# Patient Record
Sex: MALE | Race: WHITE | Employment: OTHER | URBAN - METROPOLITAN AREA
[De-identification: names, ages, dates, MRNs, and addresses within clinical notes are randomized per-mention and may not be internally consistent; named-entity substitution may affect disease eponyms.]

---

## 2018-07-11 ENCOUNTER — HOSPITAL ENCOUNTER (OUTPATIENT)
Age: 68
Setting detail: OUTPATIENT SURGERY
Discharge: HOME | End: 2018-07-11
Attending: INTERNAL MEDICINE | Admitting: INTERNAL MEDICINE
Payer: MEDICARE

## 2018-07-11 VITALS
RESPIRATION RATE: 16 BRPM | DIASTOLIC BLOOD PRESSURE: 72 MMHG | HEART RATE: 60 BPM | SYSTOLIC BLOOD PRESSURE: 120 MMHG | HEIGHT: 75 IN | BODY MASS INDEX: 29.03 KG/M2 | WEIGHT: 233.5 LBS | TEMPERATURE: 96.3 F | OXYGEN SATURATION: 94 %

## 2018-07-11 PROCEDURE — G0500 MOD SEDAT ENDO SERVICE >5YRS: HCPCS | Performed by: INTERNAL MEDICINE

## 2018-07-11 PROCEDURE — 77030038020 HC MANFLD NEPTUNE STRY -B: Performed by: INTERNAL MEDICINE

## 2018-07-11 PROCEDURE — 77030020256 HC SOL INJ NACL 0.9%  500ML: Performed by: INTERNAL MEDICINE

## 2018-07-11 PROCEDURE — 76040000007: Performed by: INTERNAL MEDICINE

## 2018-07-11 PROCEDURE — 88305 TISSUE EXAM BY PATHOLOGIST: CPT | Performed by: INTERNAL MEDICINE

## 2018-07-11 PROCEDURE — 74011250636 HC RX REV CODE- 250/636

## 2018-07-11 RX ORDER — FENTANYL CITRATE 50 UG/ML
100 INJECTION, SOLUTION INTRAMUSCULAR; INTRAVENOUS
Status: DISCONTINUED | OUTPATIENT
Start: 2018-07-11 | End: 2018-07-11 | Stop reason: HOSPADM

## 2018-07-11 RX ORDER — SODIUM CHLORIDE 9 MG/ML
100 INJECTION, SOLUTION INTRAVENOUS CONTINUOUS
Status: CANCELLED | OUTPATIENT
Start: 2018-07-11 | End: 2018-07-11

## 2018-07-11 RX ORDER — DEXTROMETHORPHAN/PSEUDOEPHED 2.5-7.5/.8
1.2 DROPS ORAL
Status: CANCELLED | OUTPATIENT
Start: 2018-07-11

## 2018-07-11 RX ORDER — MIDAZOLAM HYDROCHLORIDE 1 MG/ML
.5-5 INJECTION, SOLUTION INTRAMUSCULAR; INTRAVENOUS
Status: DISCONTINUED | OUTPATIENT
Start: 2018-07-11 | End: 2018-07-11 | Stop reason: HOSPADM

## 2018-07-11 RX ORDER — EPINEPHRINE 0.1 MG/ML
1 INJECTION INTRACARDIAC; INTRAVENOUS
Status: CANCELLED | OUTPATIENT
Start: 2018-07-11 | End: 2018-07-12

## 2018-07-11 RX ORDER — ATROPINE SULFATE 0.1 MG/ML
0.5 INJECTION INTRAVENOUS
Status: CANCELLED | OUTPATIENT
Start: 2018-07-11 | End: 2018-07-12

## 2018-07-11 RX ORDER — OMEPRAZOLE 20 MG/1
20 CAPSULE, DELAYED RELEASE ORAL DAILY
COMMUNITY

## 2018-07-11 RX ORDER — NALOXONE HYDROCHLORIDE 0.4 MG/ML
0.4 INJECTION, SOLUTION INTRAMUSCULAR; INTRAVENOUS; SUBCUTANEOUS
Status: DISCONTINUED | OUTPATIENT
Start: 2018-07-11 | End: 2018-07-11 | Stop reason: HOSPADM

## 2018-07-11 RX ORDER — FLUMAZENIL 0.1 MG/ML
0.2 INJECTION INTRAVENOUS
Status: DISCONTINUED | OUTPATIENT
Start: 2018-07-11 | End: 2018-07-11 | Stop reason: HOSPADM

## 2018-07-11 RX ORDER — AMOXICILLIN 500 MG/1
500 CAPSULE ORAL
COMMUNITY

## 2018-07-11 RX ORDER — METOPROLOL TARTRATE 25 MG/1
25 TABLET, FILM COATED ORAL 2 TIMES DAILY
COMMUNITY

## 2018-07-11 NOTE — IP AVS SNAPSHOT
[address] [address] 
[phone] Patient: Sam Pittman MRN: [id number] About your hospitalization You were admitted on:  July 11, 2018 You last received care in the:  [facility] ENDOSCOPY You were discharged on:  July 11, 2018 Why you were hospitalized Your primary diagnosis was:  Not on File Follow-up Information None Discharge Orders None A check alicia indicates which time of day the medication should be taken. My Medications ASK your doctor about these medications Instructions Each Dose to Equal  
 Morning Noon Evening Bedtime  
 amoxicillin 500 mg capsule Commonly known as:  AMOXIL Your last dose was: Your next dose is: Take 500 mg by mouth. 500 mg  
    
   
   
   
  
 aspirin delayed-release 81 mg tablet Your last dose was: Your next dose is: Take 81 mg by mouth daily. 81 mg  
    
   
   
   
  
 metoprolol tartrate 25 mg tablet Commonly known as:  LOPRESSOR Your last dose was: Your next dose is: Take 25 mg by mouth two (2) times a day. 25 mg  
    
   
   
   
  
 multivitamin tablet Commonly known as:  ONE A DAY Your last dose was: Your next dose is: Take 1 Tab by mouth daily. 1 Tab  
    
   
   
   
  
 omeprazole 20 mg capsule Commonly known as:  PRILOSEC Your last dose was: Your next dose is: Take 20 mg by mouth daily. 20 mg  
    
   
   
   
  
 rosuvastatin 40 mg tablet Commonly known as:  CRESTOR Your last dose was: Your next dose is: Take 40 mg by mouth daily. Indications: HYPERCHOLESTEROLEMIA 40 mg Discharge Instructions Sam Pittman [id number] 
1950 COLON DISCHARGE INSTRUCTIONS Discomfort: Redness at IV site- apply warm compress to area; if redness or soreness persist- contact your physician There may be a slight amount of blood passed from the rectum Gaseous discomfort- walking, belching will help relieve any discomfort You may not operate a vehicle til the next day. You may not engage in an occupation involving machinery or appliances til the next day. You may not drink alcoholic beverages til the next day. DIET: 
 High fiber diet. ACTIVITY: 
You may not  resume your normal daily activities til the next day. it is recommended that you spend the remainder of the day resting -  avoid any strenuous activity. CALL RAMOS Arnold ANY SIGN OF: Increasing pain, nausea, vomiting Abdominal distension (swelling) New increased bleeding (oral or rectal) Fever (chills) Pain in chest area Bloody discharge from nose or mouth Shortness of breath You may not  take any Advil, Aspirin, Ibuprofen, Motrin, Aleve, or Goodys for 3 days, ONLY  Tylenol as needed for pain. Post procedure diagnosis:  HEMORRHOIDS, POLYPS, Follow-up Instructions: Your follow up colonoscopy will be in 5 years. We will notify you the results of your biopsy by letter within 2 weeks. Mahendra Cuadra MD 
July 11, 2018 Patient armband removed and shredded DISCHARGE SUMMARY from Nurse PATIENT INSTRUCTIONS: 
 
 
F-face looks uneven A-arms unable to move or move unevenly S-speech slurred or non-existent T-time-call 911 as soon as signs and symptoms begin-DO NOT go Back to bed or wait to see if you get better-TIME IS BRAIN. Warning Signs of HEART ATTACK Call 911 if you have these symptoms: 
? Chest discomfort.  Most heart attacks involve discomfort in the center of the chest that lasts more than a few minutes, or that goes away and comes back. It can feel like uncomfortable pressure, squeezing, fullness, or pain. ? Discomfort in other areas of the upper body. Symptoms can include pain or discomfort in one or both arms, the back, neck, jaw, or stomach. ? Shortness of breath with or without chest discomfort. ? Other signs may include breaking out in a cold sweat, nausea, or lightheadedness. Don't wait more than five minutes to call [address]! Fast action can save your life. Calling 911 is almost always the fastest way to get lifesaving treatment. Emergency Medical Services staff can begin treatment when they arrive  up to an hour sooner than if someone gets to the hospital by car. The discharge information has been reviewed with the patient and spouse. The patient and spouse verbalized understanding. Discharge medications reviewed with the patient and spouse and appropriate educational materials and side effects teaching were provided. ___________________________________________________________________________________________________________________________________ Introducing [facility]! [employer] introduces [employer] patient portal. Now you can access parts of your medical record, email your doctor's office, and request medication refills online. 1. In your internet browser, go to [email or web address] 2. Click on the First Time User? Click Here link in the Sign In box. You will see the New Member Sign Up page. 3. Enter your [employer] Access Code exactly as it appears below. You will not need to use this code after youve completed the sign-up process. If you do not sign up before the expiration date, you must request a new code. · [employer] Access Code: [email or web address] Expires: 10/9/2018 10:00 AM 
 
4.  Enter the last four digits of your Social Security Number (xxxx) and Date of Birth (mm/dd/yyyy) as indicated and click Submit. You will be taken to the next sign-up page. 5. Create a [employer] ID. This will be your [employer] login ID and cannot be changed, so think of one that is secure and easy to remember. 6. Create a [employer] password. You can change your password at any time. 7. Enter your Password Reset Question and Answer. This can be used at a later time if you forget your password. 8. Enter your e-mail address. You will receive e-mail notification when new information is available in [address] 9. Click Sign Up. You can now view and download portions of your medical record. 10. Click the Download Summary menu link to download a portable copy of your medical information. If you have questions, please visit the Frequently Asked Questions section of the [employer] website. Remember, [employer] is NOT to be used for urgent needs. For medical emergencies, dial 911. Now available from your iPhone and Android! Introducing [employer] [facility] As a [employer] patient, I wanted to make you aware of our electronic visit tool called Gabriel Shaggyanuraggavin. [employer] 24/7 allows you to connect within minutes with a medical provider 24 hours a day, seven days a week via a mobile device or tablet or logging into a secure website from your computer. You can access [facility] Leiva from anywhere in the [place]. A virtual visit might be right for you when you have a simple condition and feel like you just dont want to get out of bed, or cant get away from work for an appointment, when your regular [facility] provider is not available (evenings, weekends or holidays), or when youre out of town and need minor care. Electronic visits cost only $49 and if the [employer] 24/7 provider determines a prescription is needed to treat your condition, one can be electronically transmitted to a nearby pharmacy*. Please take a moment to enroll today if you have not already done so. The enrollment process is free and takes just a few minutes. To enroll, please download the [employer] 24/7 monster to your tablet or phone, or visit [email or web address] to enroll on your computer. And, as an [address] [place] [address] patient with a [employer] account, the results of your visits will be scanned into your electronic medical record and your primary care provider will be able to view the scanned results. We urge you to continue to see your regular [employer] provider for your ongoing medical care. And while your primary care provider may not be the one available when you seek a [employer] virtual visit, the peace of mind you get from getting a real diagnosis real time can be priceless. For more information on [employer], view our Frequently Asked Questions (FAQs) at [email or web address]. Sincerely, 
 
Gil Garcia MD 
Chief Medical Officer [employer] *:  certain medications cannot be prescribed via [employer] Providers Seen During Your Hospitalization Provider Specialty Primary office phone Kristie Carcamo MD Gastroenterology [phone] Your Primary Care Physician (PCP) Primary Care Physician Office Phone Office Fax Javier Edwards [phone] You are allergic to the following Allergen Reactions Carrot Hives Swelling Raw carrots reported by patient Peanut Hives Itching Swelling Recent Documentation Height Weight BMI Smoking Status 1.905 m 105.9 kg 29.19 kg/m2 Former Smoker Emergency Contacts Name Discharge Info Relation Home Work Mobile [address] CAREGIVER [3] Spouse [3] [phone] Patient Belongings The following personal items are in your possession at time of discharge: Please provide this summary of care documentation to your next provider. Signatures-by signing, you are acknowledging that this After Visit Summary has been reviewed with you and you have received a copy. Patient Signature:  ____________________________________________________________ Date:  ____________________________________________________________  
  
Suzon Mems Provider Signature:  ____________________________________________________________ Date:  ____________________________________________________________

## 2018-07-11 NOTE — H&P
This 78year old male presents for Colon Cancer Screening. History of Present Illness: 1. Colon Cancer Screening Prior screening:  colonoscopy, 08 by Dr. Timothy Avilez and normal.  Denies risk factors. Pertinent negatives include abdominal pain, change in bowel habits, constipation, decreased appetite, diarrhea, melena, nausea, vomiting and weight loss. Additional information: No family history of colon cancer, No family history of Crohn's/colitis and NSAID/ASA use. PROBLEM LIST: 
Problem Description Onset Date Mixed hyperlipidemia 2011 Gouty arthropathy 2011 Prostate finding 2011 Idiopathic peripheral neuropathy 2011 Vitamin D deficiency 2011 Dyson's esophagus with esophagitis 2016 PAST MEDICAL/SURGICAL HISTORY   (Detailed) Disease/disorder Onset Date Management Date Comments  
   both knees partial replacement 2016 Arthroscopy knee DIAGNOSTICS HISTORY: 
Test Ordered Interpretation Result completed * CT CHEST W/DYE 05/10/2013   05/10/2013 Heart Catheterization Right And Left  see detail See scanned report. Successful stenting of right PDA, with placement of overlapping drug eluting stents 2016 Test Ordered Ordering Comments Modifier * CT CHEST W/DYE 05/10/2013 Heart Catheterization Right And Left Family History:  (Detailed) Relationship Family Member Name  Age at Death Condition Onset Age Cause of Death Father  Debbie Garcia Father  Y 80 heart failure  Y Mother  Y 80   N Mother  Y  old age  [place] Social History:  (Detailed) Tobacco use reviewed. The patient is right-handed. Preferred language is Intermedia. MARITAL STATUS/FAMILY/SOCIAL SUPPORT Currently . Tobacco use status: Ex-cigarette smoker. Smoking status: Former smoker. SMOKING STATUS Use Status Type Smoking Status Usage Per Day Years Used Total Pack Years  
yes Cigarette Former smoker 2 Packs 20.00 40.00 There is passive smoke exposure. ALCOHOL There is a history of alcohol use. Type: Beer and wine. 1 drink CAFFEINE The patient uses caffeine: coffee - 2 cups a day. Patient Status Completed with information received for patient transitioning into care. Medication Reconciliation Medications reconciled today. Medication Reviewed Adherence Medication Name Sig Desc Elsewhere Status  
taking as directed Toprol XL 25 mg tablet,extended release take 1 tablet (25MG)  by oral route  every day N Verified  
taking as directed Crestor 40 mg tablet take 1 tablet by oral route  every day Y Verified  
taking as directed fenofibrate nanocrystallized 145 mg tablet take 1 tablet by oral route  every day Y Verified  
taking as directed EpiPen 0.3 mg/0.3 mL injection, auto-injector inject (0.3MG)  by intramuscular route once as needed for anaphylaxis N Verified  
taking as directed Effient 10 mg tablet take 1 tablet by oral route  every day Y Verified  
taking as directed Aspirin Low Dose 81 mg tablet,delayed release take 2 tablet by oral route  every day Y Verified  
taking as directed OMEPRAZOLE 20MG CAP TAKE ONE CAPSULE BY MOUTH ONCE DAILY 30 TO 60  MINUTES BEFORE A MEAL. N Verified  
taking as directed GaviLyte-N 420 gram oral solution take as directed by physician N Verified Medications (added, continued or stopped this visit): 
Started Medication Directions PRN Status PRN Reason Instruction Stopped Aspirin Low Dose 81 mg tablet,delayed release take 2 tablet by oral route  every day N     
 Crestor 40 mg tablet take 1 tablet by oral route  every day N  Effient 10 mg tablet take 1 tablet by oral route  every day N     
05/18/2016 EpiPen 0.3 mg/0.3 mL injection, auto-injector inject (0.3MG)  by intramuscular route once as needed for anaphylaxis N     
 fenofibrate nanocrystallized 145 mg tablet take 1 tablet by oral route  every day N     
06/12/2018 GaviLyte-N 420 gram oral solution take as directed by physician N     
07/26/2017 OMEPRAZOLE 20MG CAP TAKE ONE CAPSULE BY MOUTH ONCE DAILY 30 TO 60  MINUTES BEFORE A MEAL. N     
12/14/2012 Toprol XL 25 mg tablet,extended release take 1 tablet (25MG)  by oral route  every day N Allergies: 
Ingredient Reaction Medication Name Comment NO KNOWN ALLERGIES Review of Systems System Neg/Pos Details Constitutional Negative Chills, fever, malaise and weight loss. ENMT Negative Nasal congestion and sore throat. Eyes Negative Double vision. Respiratory Negative Asthma, chronic cough and wheezing. Cardio Negative Chest pain, edema and irregular heartbeat/palpitations. GI Negative Abdominal pain, change in bowel habits, constipation, decreased appetite, diarrhea, dysphagia, heartburn, hematemesis, hematochezia, melena, nausea, reflux and vomiting.  Negative Dysuria and hematuria. Endocrine Negative Cold intolerance, gynecomastia, heat intolerance and increased thirst.  
Neuro Negative Dizziness, headache, numbness, tremors and vertigo. Psych Negative Anxiety, depression and increased stress. Integumentary Negative Hives and rash. MS Negative Back pain, joint pain and myalgia. Hema/Lymph Positive Easy bleeding, Easy bruising. Allergic/Immuno Negative Contact allergy, food allergies and seasonal allergies. Vital Signs Height Time ft in cm Last Measured Height Position 9:45 AM 6.0 3.00 190.50 06/12/2018 0 Weight/BSA/BMI Time lb oz kg Context BMI kg/m2 BSA m2  
9:45 .00  108.862 dressed with shoes 30.00  Date/Time Temp Pulse BP MAP (Calculated) Arterial Line 1 BP (mmHg) BP Patient Position Resp SpO2 O2 Device O2 Flow Rate (L/min) Pre/Post Ductal Weight  
  07/11/18 0857 --  54 118/74 89 -- -- 12 97 % Nasal cannula 2 l/min -- --  
  07/11/18 0748 96 °F (35.6 °C) 61 115/75 88 -- -- 14 95 % Room air -- -- 105.9 kg (233 lb 8 oz)  
   
 
 
PHYSICAL EXAM: 
Exam Findings Details Constitutional Normal Well developed. Eyes Normal Conjunctiva - Right: Normal, Left: Normal. Sclera - Right: Normal, Left: Normal.  
Nasopharynx * Lips/teeth/gums - missing teeth. Nasopharynx Normal Buccal mucosa - Normal.  
Neck Exam Normal Inspection - Normal. Palpation - Normal. Thyroid gland - Normal.  
Respiratory Normal Inspection - Normal. Auscultation - Normal.  
Cardiovascular Normal Regular rate and rhythm. No murmurs, gallops, or rubs. Vascular Normal Pulses - Radial: Normal, Brachial: Normal, Dorsalis pedis: Normal, Posterior tibial: Normal.  
Abdomen Normal Inspection - Normal. Appliance(s) - None. Abdominal muscles - Normal. Auscultation - Normal. Percussion - Normal. Anterior palpation - Normal, No guarding. Umbilicus - Normal. No abdominal tenderness. No hepatic enlargement. No spleen enlargement. No hernia. No ascites. Torres's sign - Negative. No hepatic tenderness. No hepatic bruit. Skin Normal Inspection - Normal.  
Musculoskeletal Normal Hands/Wrist - Right: Normal, Left: Normal.  
Extremity Normal No edema. Neurological Normal Fine motor skills - Normal.  
Psychiatric * Oriented to time, place, person and situation. Psychiatric Normal Appropriate mood and affect. Assessment/Plan # Detail Type Description 1. Assessment Encounter for screening colonoscopy (Z12.11). Patient Plan 78year year old male  patient of Dr. Yane Joy for colonoscopy screening. Last colonoscopy completed by Dr. Cj Riggs was 05/07/2008. Impression revealed entire normal colon. Patient has allergies to nuts and corn with a urticaria and throat edema. Denies herbal consumption. Medical hx includes HTN, hyperlipidemia well controlled with medications. MI X2 in 2011 & 2013 with stent placement. No significant, pulmonary, GI, , musculoskeletal, or endocrine issues. Surgical hx unremarkable. No family history of colorectal CA. Denies tobacco use and reports 1-2 glasses of wine per night with dinner.  No significant weight gains or losses in the last 3-6 months. No heat or cold intolerances. Patient states  no N/V/D, fever, chills, sick contacts, SOB, abdominal or chest pain, changes in bowel pattern or constipation issues. Last BM once daily. Normal color, soft, formed in consistency. No evidence of blood or mucus. No dysphagia, apetite is good which consists of 3 meals per day. I explained the procedure of colonoscopy, where there is a chance of taking a biopsy, injecting, dilating or removing polyps. I explained also the risk and benefits involved which include but not limited to reaction to medication/ sedation, bleeding, perforation, where there may be a need for surgery. There is also a chance of missing a lesion or a polyp if the bowel prep is inadequate or the colon is unusually tortuous and difficult. I answered his questions. He was agreeable to proceed with the test. I gave him the [facility] to clean the bowels. He may have to take extra laxatives the days before to assure that the bowel prep is as perfect as possible.

## 2018-07-11 NOTE — PROCEDURES
[facility]  Colonoscopy Procedure Report  _______________________________________________________  Patient: Yin Rey                                         Attending Physician: Feliberto Barr MD    Patient ID: [id number]                                      Referring Physician: Sommer Graham MD    Exam Date: July 11, 2018 _______________________________________________________      Introduction: A  78 y.o. male patient, presents for outpatient Colonoscopy    Indications: Screen colon cancer, average risk and asymptomatic. He had a negative colonoscopy in 2008 by Dr Nilda Staton. Consent: The benefits, risks, and alternatives to the procedure were discussed and informed consent was obtained from the patient. Preparation: EKG, pulse, pulse oximetry and blood pressure were monitored throughout the procedure. ASA Classification: Class 2 - . The heart is an S1-S2 and regular heart rate and rhythm. Lungs are clear to auscultation and percussion. Abdomen is soft, nondistended, and nontender. Mental Status: awake, alert, and oriented to person, place, and time    Medications:  · Fentanyl 100 mcg IV before procedure. · Versed 4 mg IV throughout the procedure. Rectal Exam: One External hemorrhoids otherwise Normal Rectal Exam. No Blood. Prostate not enlarged. Pathology Specimens: One specimen removed. Procedure: The colonoscope was passed with ease through the anus under direct visualization and advanced to the cecum and 15 cm inside the terminal ileum. The scope was withdrawn and the mucosa was carefully examined. The quality of the preparation was excellent. The views were excellent. The patient's toleration of the procedure was excellent. Retroflexion was preformed in the ascending colon and hepatic flexure. The exam was done twice to the cecum. Total time is 14 minutes and withdrawal time is 10 minutes. Findings:    Rectum:   Small internal hemorrhoids.   Sigmoid: normal   Descending Colon:   Normal  Transverse Colon:   Normal  Ascending Colon:   Normal  Cecum:   2 mm sessile adenoma polyp in the cecum removed with cold forceps. Terminal Ileum:   Normal    Unplanned Events: There were no unplanned events. Estimated Blood Loss: None  Impressions:  External and internal hemorrhoids. 2 mm sessile adenoma polyp in the cecum removed with cold forceps. Normal Mucosa. No diverticula or other polyps found. Complications: None; patient tolerated the procedure well. Recommendations:  · Discharge home when standard parameters are met. · Resume a high fiber diet. · Colonoscopy recommendation in 5 years.     Procedure Codes:    · Kam Lyon [[id number]]    Endoscope Information:  Model Number(s)    [id number]       Assistant: None      Signed By: Richard Ahn MD Date: July 11, 2018

## 2018-07-11 NOTE — DISCHARGE INSTRUCTIONS
Kathy Collazo  [id number]  1950    COLON DISCHARGE INSTRUCTIONS    Discomfort:  Redness at IV site- apply warm compress to area; if redness or soreness persist- contact your physician  There may be a slight amount of blood passed from the rectum  Gaseous discomfort- walking, belching will help relieve any discomfort  You may not operate a vehicle til the next day. You may not engage in an occupation involving machinery or appliances til the next day. You may not drink alcoholic beverages til the next day. DIET:   High fiber diet. ACTIVITY:  You may not  resume your normal daily activities til the next day. it is recommended that you spend the remainder of the day resting -  avoid any strenuous activity. CALL M.D.  IF ANY SIGN OF:   Increasing pain, nausea, vomiting  Abdominal distension (swelling)  New increased bleeding (oral or rectal)  Fever (chills)  Pain in chest area  Bloody discharge from nose or mouth  Shortness of breath    You may not  take any Advil, Aspirin, Ibuprofen, Motrin, Aleve, or Goodys for 3 days, ONLY  Tylenol as needed for pain. Post procedure diagnosis:  HEMORRHOIDS, POLYPS,     Follow-up Instructions: Your follow up colonoscopy will be in 5 years. We will notify you the results of your biopsy by letter within 2 weeks. Gabrielle Sifuentes MD  July 11, 2018     Patient armband removed and shredded      DISCHARGE SUMMARY from Nurse    PATIENT INSTRUCTIONS:    After general anesthesia or intravenous sedation, for 24 hours or while taking prescription Narcotics:  · Limit your activities  · Do not drive and operate hazardous machinery  · Do not make important personal or business decisions  · Do  not drink alcoholic beverages  · If you have not urinated within 8 hours after discharge, please contact your surgeon on call.     Report the following to your surgeon:  · Excessive pain, swelling, redness or odor of or around the surgical area  · Temperature over 100.5  · Nausea and vomiting lasting longer than 4 hours or if unable to take medications  · Any signs of decreased circulation or nerve impairment to extremity: change in color, persistent  numbness, tingling, coldness or increase pain  · Any questions    What to do at Home:  Recommended activity: as above,     If you experience any of the following symptoms as above, please follow up with Dr Melinda Marcum. *  Please give a list of your current medications to your Primary Care Provider. *  Please update this list whenever your medications are discontinued, doses are      changed, or new medications (including over-the-counter products) are added. *  Please carry medication information at all times in case of emergency situations. These are general instructions for a healthy lifestyle:    No smoking/ No tobacco products/ Avoid exposure to second hand smoke  Surgeon General's Warning:  Quitting smoking now greatly reduces serious risk to your health. Obesity, smoking, and sedentary lifestyle greatly increases your risk for illness    A healthy diet, regular physical exercise & weight monitoring are important for maintaining a healthy lifestyle    You may be retaining fluid if you have a history of heart failure or if you experience any of the following symptoms:  Weight gain of 3 pounds or more overnight or 5 pounds in a week, increased swelling in our hands or feet or shortness of breath while lying flat in bed. Please call your doctor as soon as you notice any of these symptoms; do not wait until your next office visit. Recognize signs and symptoms of STROKE:    F-face looks uneven    A-arms unable to move or move unevenly    S-speech slurred or non-existent    T-time-call 911 as soon as signs and symptoms begin-DO NOT go       Back to bed or wait to see if you get better-TIME IS BRAIN. Warning Signs of HEART ATTACK     Call 911 if you have these symptoms:   Chest discomfort.  Most heart attacks involve discomfort in the center of the chest that lasts more than a few minutes, or that goes away and comes back. It can feel like uncomfortable pressure, squeezing, fullness, or pain.  Discomfort in other areas of the upper body. Symptoms can include pain or discomfort in one or both arms, the back, neck, jaw, or stomach.  Shortness of breath with or without chest discomfort.  Other signs may include breaking out in a cold sweat, nausea, or lightheadedness. Don't wait more than five minutes to call 911 - MINUTES MATTER! Fast action can save your life. Calling 911 is almost always the fastest way to get lifesaving treatment. Emergency Medical Services staff can begin treatment when they arrive -- up to an hour sooner than if someone gets to the hospital by car. The discharge information has been reviewed with the patient and spouse. The patient and spouse verbalized understanding. Discharge medications reviewed with the patient and spouse and appropriate educational materials and side effects teaching were provided.   ___________________________________________________________________________________________________________________________________

## (undated) DEVICE — TRNQT TEXT 1X18IN BLU LF DISP -- CONVERT TO ITEM 362165

## (undated) DEVICE — SINGLE PORT MANIFOLD: Brand: NEPTUNE 2

## (undated) DEVICE — NDL PRT INJ NSAF BLNT 18GX1.5 --

## (undated) DEVICE — MAJ-1414 SINGLE USE ADPATER BIOPSY VALV: Brand: SINGLE USE ADAPTOR BIOPSY VALVE

## (undated) DEVICE — WRISTBAND ID AD W2.5XL9.5CM RED VYN ADH CLSR UNI-PRINT

## (undated) DEVICE — SYR 5ML 1/5 GRAD LL NSAF LF --

## (undated) DEVICE — MEDI-VAC NON-CONDUCTIVE SUCTION TUBING: Brand: CARDINAL HEALTH

## (undated) DEVICE — ENDO CARRY-ON PROCEDURE KIT INCLUDES ENZYMATIC SPONGE, GAUZE, BIOHAZARD LABEL, TRAY, LUBRICANT, DIRTY SCOPE LABEL, WATER LABEL, TRAY, DRAWSTRING PAD, AND DEFENDO 4-PIECE KIT.: Brand: ENDO CARRY-ON PROCEDURE KIT

## (undated) DEVICE — CATH IV SAFE STR 22GX1IN BLU -- PROTECTIV PLUS

## (undated) DEVICE — SYR 3ML LL TIP 1/10ML GRAD --

## (undated) DEVICE — SOLUTION IV 500ML 0.9% SOD CHL FLX CONT

## (undated) DEVICE — SYRINGE 50ML E/T

## (undated) DEVICE — NDL FLTR TIP 5 MIC 18GX1.5IN --

## (undated) DEVICE — CATH SUC CTRL PRT TRIFLO 14FR --

## (undated) DEVICE — CANNULA CUSH AD W/ 14FT TBG

## (undated) DEVICE — TRAP SPEC COLL POLYP POLYSTYR --

## (undated) DEVICE — KENDALL RADIOLUCENT FOAM MONITORING ELECTRODE RECTANGULAR SHAPE: Brand: KENDALL

## (undated) DEVICE — SPONGE GZ W4XL4IN COT 12 PLY TYP VII WVN C FLD DSGN

## (undated) DEVICE — SPONGE GZ W4XL4IN RAYON POLY 4 PLY NONWOVEN FASTER WICKING

## (undated) DEVICE — SET ADMIN 16ML TBNG L100IN 2 Y INJ SITE IV PIGGY BK DISP